# Patient Record
(demographics unavailable — no encounter records)

---

## 2025-05-30 NOTE — HISTORY OF PRESENT ILLNESS
[FreeTextEntry1] : 77 y/o F with h/o PAD, renal artery stenosis, presents for claudication to bilateral LE. She has h/o R SFA stent, was treated by Dr. Garcia in the past.

## 2025-05-30 NOTE — ASSESSMENT
[FreeTextEntry1] : 77 y/o F with h/o PAD, renal artery stenosis, presents for claudication to bilateral LE. She has h/o R SFA stent, was treated by Dr. Garcia in the past.  Duplex  showed occlusion of R SFA stent, left SFA stenosis with diminished flow in the popliteal artery.  I would like to obtain a CTA of the aorta with LE runoff. I am prescribing her Pletal and will see her back in 6 weeks.  I spent 45 minutes with patient performing physical exam, reviewing duplex results, discussing treatment plan and follow up.   I, Dr. Simons, personally performed the evaluation and management (E/M) services for this established patient who presents today with (a) new problem(s)/exacerbation of (an) existing condition(s). That E/M includes conducting the clinically appropriate interval history &/or exam, assessing all new/exacerbated conditions, and establishing a new plan of care. Today, my TOYA, Lalita], was here to observe my evaluation and management service for this new problem/exacerbated condition and follow the plan of care established by me going forward.  Thank you for allowing me to participate in the care of your patient.   Sincerely,   Nelson Simons MD, RPVI, FACS Associate Professor of Surgery , Vascular Fellowship Director of Limb Salvage Surgery [facility] [place]

## 2025-05-30 NOTE — DATA REVIEWED
[FreeTextEntry1] : I performed an arterial duplex which was medically necessary to evaluate for arterial disease. It showed occlusion of R SFA stent, left SFA stenosis with diminished flow in the popliteal artery.